# Patient Record
Sex: FEMALE | Race: WHITE
[De-identification: names, ages, dates, MRNs, and addresses within clinical notes are randomized per-mention and may not be internally consistent; named-entity substitution may affect disease eponyms.]

---

## 2018-07-17 ENCOUNTER — HOSPITAL ENCOUNTER (INPATIENT)
Dept: HOSPITAL 74 - JER | Age: 68
LOS: 2 days | Discharge: HOME | DRG: 683 | End: 2018-07-19
Payer: COMMERCIAL

## 2018-07-17 VITALS — BODY MASS INDEX: 42.4 KG/M2

## 2018-07-17 DIAGNOSIS — G43.909: ICD-10-CM

## 2018-07-17 DIAGNOSIS — I10: ICD-10-CM

## 2018-07-17 DIAGNOSIS — N17.9: Primary | ICD-10-CM

## 2018-07-17 DIAGNOSIS — E66.9: ICD-10-CM

## 2018-07-17 DIAGNOSIS — N20.0: ICD-10-CM

## 2018-07-17 DIAGNOSIS — E86.0: ICD-10-CM

## 2018-07-17 DIAGNOSIS — E78.5: ICD-10-CM

## 2018-07-17 DIAGNOSIS — R10.9: ICD-10-CM

## 2018-07-17 LAB
ANION GAP SERPL CALC-SCNC: 7 MMOL/L (ref 8–16)
APPEARANCE UR: CLEAR
APPEARANCE UR: CLEAR
BILIRUB UR STRIP.AUTO-MCNC: NEGATIVE MG/DL
BILIRUB UR STRIP.AUTO-MCNC: NEGATIVE MG/DL
BUN SERPL-MCNC: 54 MG/DL (ref 7–18)
CALCIUM SERPL-MCNC: 9.3 MG/DL (ref 8.5–10.1)
CHLORIDE SERPL-SCNC: 104 MMOL/L (ref 98–107)
CO2 SERPL-SCNC: 30 MMOL/L (ref 21–32)
COLOR UR: (no result)
COLOR UR: (no result)
CREAT SERPL-MCNC: 3.7 MG/DL (ref 0.55–1.02)
CREAT UR-MCNC: 38.3 MG/DL (ref 20–320)
DEPRECATED RDW RBC AUTO: 14.5 % (ref 11.6–15.6)
GLUCOSE SERPL-MCNC: 89 MG/DL (ref 74–106)
HCT VFR BLD CALC: 44.4 % (ref 32.4–45.2)
HGB BLD-MCNC: 14.5 GM/DL (ref 10.7–15.3)
KETONES UR QL STRIP: NEGATIVE
KETONES UR QL STRIP: NEGATIVE
LEUKOCYTE ESTERASE UR QL STRIP.AUTO: NEGATIVE
LEUKOCYTE ESTERASE UR QL STRIP.AUTO: NEGATIVE
MCH RBC QN AUTO: 26.7 PG (ref 25.7–33.7)
MCHC RBC AUTO-ENTMCNC: 32.7 G/DL (ref 32–36)
MCV RBC: 81.6 FL (ref 80–96)
NITRITE UR QL STRIP: NEGATIVE
NITRITE UR QL STRIP: NEGATIVE
PH UR: 6 [PH] (ref 5–8)
PH UR: 7 [PH] (ref 5–8)
PLATELET # BLD AUTO: 250 K/MM3 (ref 134–434)
PMV BLD: 9.3 FL (ref 7.5–11.1)
POTASSIUM SERPLBLD-SCNC: 4.6 MMOL/L (ref 3.5–5.1)
PROT UR QL STRIP: NEGATIVE
RBC # BLD AUTO: 5.44 M/MM3 (ref 3.6–5.2)
SODIUM SERPL-SCNC: 141 MMOL/L (ref 136–145)
SP GR UR: 1 (ref 1–1.03)
SP GR UR: 1.01 (ref 1–1.03)
UROBILINOGEN UR STRIP-MCNC: NEGATIVE MG/DL (ref 0.2–1)
UROBILINOGEN UR STRIP-MCNC: NEGATIVE MG/DL (ref 0.2–1)
WBC # BLD AUTO: 7.5 K/MM3 (ref 4–10)

## 2018-07-17 RX ADMIN — ACETAMINOPHEN PRN MG: 325 TABLET ORAL at 09:10

## 2018-07-17 RX ADMIN — ACETAMINOPHEN PRN MG: 325 TABLET ORAL at 15:07

## 2018-07-17 RX ADMIN — HEPARIN SODIUM SCH UNIT: 5000 INJECTION, SOLUTION INTRAVENOUS; SUBCUTANEOUS at 21:05

## 2018-07-17 RX ADMIN — PANTOPRAZOLE SODIUM SCH MG: 40 TABLET, DELAYED RELEASE ORAL at 10:10

## 2018-07-17 RX ADMIN — HEPARIN SODIUM SCH UNIT: 5000 INJECTION, SOLUTION INTRAVENOUS; SUBCUTANEOUS at 14:31

## 2018-07-17 RX ADMIN — SODIUM CHLORIDE SCH MLS/HR: 9 INJECTION, SOLUTION INTRAVENOUS at 16:49

## 2018-07-17 RX ADMIN — MONTELUKAST SODIUM SCH MG: 10 TABLET, COATED ORAL at 21:05

## 2018-07-17 RX ADMIN — MONTELUKAST SODIUM SCH MG: 10 TABLET, COATED ORAL at 10:10

## 2018-07-17 RX ADMIN — HEPARIN SODIUM SCH: 5000 INJECTION, SOLUTION INTRAVENOUS; SUBCUTANEOUS at 10:00

## 2018-07-17 RX ADMIN — ATENOLOL SCH MG: 50 TABLET ORAL at 10:45

## 2018-07-17 NOTE — PDOC
Attending Attestation





- HPI


HPI: 





07/17/18 04:57


Patient is a 67 year old female with a significant past medical history of high 

cholesterol, hypertension, who presents to the ED with complaints of diarrhea 

and associated nausea that began earlier this week.  Patient reports being 

prescribed new medication by her PCP which she states is the reason for her 

symptoms.  She reports experiencing associated nausea, throat tightness and 

chest tightness after being recently being prescribed losartan, prompting her 

to come into the ED for further evaluation due to her concern.  





Denies chest pain, Sob. Denies nausea, vomiting. Denies fevers, chills. Denies 

contact with sick individuals, out of state travelling. Denies trauma to 

affected area. Denies dysuria, hematuria. Denies constipation, diarrhea. Denies 

any other symptoms. 





Allergies: None


Social history: No smoking. No alcohol. No illicit drugs. 


Surgical history: None.


PMD: Dr. Villarreal








<King Kamara - Last Filed: 07/17/18 04:57>





- Resident


Resident Name: Edouard Case





- ED Attending Attestation


I have performed the following: I have examined & evaluated the patient, The 

case was reviewed & discussed with the resident, I agree w/resident's findings 

& plan, Exceptions are as noted





- Physicial Exam


PE: 





07/17/18 19:21


*Physical Exam





General Appearance: Yes: Appropriately Dressed.  No: Apparent Distress, 

Intoxicated


HEENT: positive: EOMI, TERRELL, Normal ENT Inspection, Normal Voice, TMs Normal, 

Pharynx Normal.  negative: Pale Conjunctivae, Photophobia, Scleral Icterus (R), 

Scleral Icterus (L)


Neck: positive: Trachea midline, Normal Thyroid, Supple.  negative: Tender, 

Rigid, Carotid bruit, Stridor, Lymphadenopathy (R), Lymphadenopathy (L), 

Thyromegaly


Respiratory/Chest: positive: Lungs Clear, Normal Breath Sounds.  negative: 

Chest Tender, Respiratory Distress, Accessory Muscle Use, Labored Respiration, 

RES, Crackles, Rales, Rhonchi, Stridor, Wheezing, Dullness


Cardiovascular: positive: Regular Rhythm, Regular Rate, S1, S2.  negative: Edema

, JVD, Murmur, Bradycardia, Tachycardia


Vascular Pulses: Dorsalis-Pedis (R): 2+, Doralis-Pedis (L): 2+


Gastrointestinal/Abdominal: positive: Normal Bowel Sounds, Flat, Soft. mild 

diffuse tenderness   negative: Organomegaly, Pulsatile Mass, Increased Bowel 

Sounds, Decreased BS, Distended, Guarding, Rebound, Hernia, Hepatomegaly, 

Spleenomegaly


Lymphatic: negative: Adenopathy, Tenderness


Musculoskeletal: positive: Normal Inspection.  negative: CVA Tenderness, 

Decreased Range of Motion


Extremity: positive: Normal Capillary Refill, Normal Inspection, Normal Range 

of Motion, Pelvis Stable.  negative: Tender, Pedal Edema, Swelling, Erythema


Integumentary: positive: Normal Color, Dry, Warm.  negative: Cyanotic, Erythema

, Jaundice, Rash


Neurologic: positive: CNs II-XII NML intact, Fully Oriented, Alert, Normal Mood/

Affect, Motor Strength 5/5.  negative: EOM Palsy, Facial Droop, Sensory Deficit





- Medical Decision Making





07/17/18 19:22


Pt admitted for further care and evaluation.  





<Sourav Wahl - Last Filed: 07/17/18 19:22>

## 2018-07-17 NOTE — PN
Teaching Attending Note


Name of Resident: Ada Rai (Nephrology)





ATTENDING PHYSICIAN STATEMENT





I saw and evaluated the patient.


I reviewed the resident's note and discussed the case with the resident.


I agree with the resident's findings and plan as documented.








Nephrology


Pt is a 67 year old female with pmhx of HTN and migraine who presents to the ER 

with abdominal pain and diarrhea. She was 


found to be in acute renal failure and I was called to evaluate her. She denies 

shortness of breath. She denies


history of CKD. He does use nsaids. She was started on valsartana nd 

triamterene with hctz. She had an allergic


reaction to them associated with GI symptoms. The shortness of breath and chest 

tightness improved but her


loose stools persisted. 


pmhx migraine htn


ros loose stool


allergies hctz triamterene


family hx non contrib





 Current Medications











Generic Name Dose Route Start Last Admin





  Trade Name Freq  PRN Reason Stop Dose Admin


 


Acetaminophen/Butalbital/Caffeine  1 tablet  07/17/18 16:33  





  Fioricet -  PO   





  Q6H PRN   





  HEADACHE   





     





     





     


 


Atenolol  50 mg  07/17/18 10:15  07/17/18 10:45





  Tenormin -  PO   50 mg





  DAILY KIKI   Administration





     





     





     





     


 


Heparin Sodium (Porcine)  5,000 unit  07/17/18 09:00  07/17/18 14:31





  Heparin -  SQ   5,000 unit





  TID KIKI   Administration





     





     





     





     


 


Sodium Chloride  1,000 mls @ 50 mls/hr  07/17/18 16:15  





  Normal Saline -  IV   





  ASDIR KIKI   





     





     





     





     


 


Montelukast Sodium  10 mg  07/17/18 10:00  07/17/18 10:10





  Singulair -  PO   10 mg





  HS KIKI   Administration





     





     





     





     


 


Ondansetron HCl  4 mg  07/17/18 09:26  07/17/18 10:05





  Zofran Injection  IVPUSH   4 mg





  Q6H PRN   Administration





  NAUSEA   





     





     





     


 


Pantoprazole Sodium  40 mg  07/17/18 10:00  07/17/18 10:10





  Protonix -  PO   40 mg





  DAILY KIKI   Administration





     





     





     





     








 Laboratory Tests











  10/26/11 06/08/12 07/17/18





  07:32 11:20 04:47


 


RBC    5.44 H


 


Creatinine  0.6  D  0.7 


 


BUN   


 


Urine Protein   


 


Urine Blood   


 


Ur Random Sodium   


 


Urine Creatinine   














  07/17/18 07/17/18 07/17/18





  04:47 06:10 09:32


 


RBC   


 


Creatinine  3.7 H  


 


BUN  54 H  


 


Urine Protein   Negative 


 


Urine Blood   Negative 


 


Ur Random Sodium    89


 


Urine Creatinine    38.3








 Last Vital Signs











Temp Pulse Resp BP Pulse Ox


 


 98.2 F   56 L  20   159/80   95 


 


 07/17/18 15:49  07/17/18 15:49  07/17/18 15:49  07/17/18 15:49  07/17/18 13:20





cxr reviewed








cardio s1s2 reg


pulm clear


GI soft, obese


ext neg edema 


neuro awake and alert





Impression


1. DARLENE


2. HTN


3. allergic reaction


4. migraine


5. nephrolithiasis





Plan


- cont with fluids


- repeat labs in am


- check renal ultrasound


- repeat ua


- hold diuretics


- will check abby and anca


- attempt to obtain outpt labs from last week


- will follow


Dr Gaston

## 2018-07-17 NOTE — PDOC
*Physical Exam





- Vital Signs


 Last Vital Signs











Temp Pulse Resp BP Pulse Ox


 


 98.4 F   52 L  16   130/80   97 


 


 07/17/18 04:06  07/17/18 06:18  07/17/18 06:18  07/17/18 06:18  07/17/18 06:18














ED Treatment Course





- LABORATORY


CBC & Chemistry Diagram: 


 07/17/18 04:47





 07/17/18 04:47





- ADDITIONAL ORDERS


Additional order review: 


 Laboratory  Results











  07/17/18 07/17/18





  06:10 04:47


 


Sodium   141


 


Potassium   4.6


 


Chloride   104


 


Carbon Dioxide   30


 


Anion Gap   7 L


 


BUN   54 H


 


Creatinine   3.7 H


 


Creat Clearance w eGFR   12.22


 


Random Glucose   89


 


Calcium   9.3


 


Troponin I   < 0.02


 


Urine Color  Straw 


 


Urine Appearance  Clear 


 


Urine pH  7.0 


 


Ur Specific Gravity  1.010 


 


Urine Protein  Negative 


 


Urine Glucose (UA)  Negative 


 


Urine Ketones  Negative 


 


Urine Blood  Negative 


 


Urine Nitrite  Negative 


 


Urine Bilirubin  Negative 


 


Urine Urobilinogen  Negative 


 


Ur Leukocyte Esterase  Negative 








 











  07/17/18





  04:47


 


RBC  5.44 H


 


MCV  81.6


 


MCHC  32.7


 


RDW  14.5


 


MPV  9.3














- Medications


Given in the ED: 


ED Medications














Discontinued Medications














Generic Name Dose Route Start Last Admin





  Trade Name Freq  PRN Reason Stop Dose Admin


 


Al Hydroxide/Mg Hydroxide  30 ml  07/17/18 05:00  07/17/18 05:03





  Mylanta Oral Suspension -  PO  07/17/18 05:01  30 ml





  ONCE ONE   Administration





     





     





     





     


 


Famotidine/Sodium Chloride  20 mg in 50 mls @ 100 mls/hr  07/17/18 05:20  07/17/ 18 05:31





  Pepcid 20 Mg Premixed Ivpb -  IVPB  07/17/18 05:49  100 mls/hr





  ONCE ONE   Administration





     





     





     





     


 


Ondansetron HCl  4 mg  07/17/18 05:20  07/17/18 05:31





  Zofran Injection  IVPUSH  07/17/18 05:21  4 mg





  ONCE ONE   Administration





     





     





     





     


 


Sodium Chloride  1,000 ml  07/17/18 04:32  07/17/18 04:51





  Normal Saline -  IV  07/17/18 04:33  1,000 ml





  ONCE ONE   Administration





     





     





     





     














Medical Decision Making





- Medical Decision Making





07/17/18 08:44


CT Read: Possible stone :


Perinephric stranding with mild dilatation of the right ureter and a possible 

nonobstructing


stone on the left distally as described above. Consider CT urogram or 

retrograde study.


Findings on the right may represent a recently passed stone, extrinsic 

compression by a


bulky uterus.


Probable cyst within the left adnexa as described above, consider ultrasound 

imaging as


clinically warranted.





*DC/Admit/Observation/Transfer


Diagnosis at time of Disposition: 


 DARLENE (acute kidney injury)








- Discharge Dispostion


Condition at time of disposition: Fair





- Referrals





- Patient Instructions





- Post Discharge Activity

## 2018-07-17 NOTE — HP
CHIEF COMPLAINT: Nausea, diarrhea





PCP: Dr. Villarreal





HISTORY OF PRESENT ILLNESS:


67 year-old female with a PMH significant for HTN and HLD who presented to the 

ED with abdominal pain, nausea, and diarrhea. On 7/12/18 the patient's PCP 

modified her medication regimen by stopping atenolol and starting Verapamil and 

Triamterene-HCTZ. She took the medication for one day and experienced throat 

swelling, chest tightness, epigastric abdominal pain, and nausea. She stopped 

taking the medications and all of the symptoms resolved except the epigastric 

pain and nausea which continues. Patient also reports one day of watery, loose, 

non-bloody stools. Patient denies fever, sweats, chills. 





ER course was notable for:


(1) /98-->130/80


(2) BUN/Cr 54/3.7


(3) UA negative





Recent Travel:





PAST MEDICAL HISTORY:


Hypertension


Hyperlipidemia





PAST SURGICAL HISTORY:


None reported





Social History:


Smoking: Never


Alcohol: no


Drugs: no





Family History:


Allergies


No Known Drug Allergies Allergy (Verified 07/17/18 04:16)


 








HOME MEDICATIONS:





Per [facility] and patient:


Verapramil SR 240mg daily  - *new prescription


Triamterene/HCTZ 37.5/25 daily - *new prescription


Etodolac 400mg BID


Atenolol/chlorthalidone 100/25  1/2 tab daily (stopped)


Singulair 10mg QD





 


REVIEW OF SYSTEMS


CONSTITUTIONAL: 


Absent:  fever, chills, diaphoresis, generalized weakness, malaise, loss of 

appetite, weight change


HEENT: 


Absent:  rhinorrhea, nasal congestion, throat pain, throat swelling, difficulty 

swallowing, mouth swelling, ear pain, eye pain, visual changes


CARDIOVASCULAR: 


Absent: chest pain, syncope, palpitations, irregular heart rate, lightheadedness

, peripheral edema


RESPIRATORY: 


Absent: cough, shortness of breath, dyspnea with exertion, orthopnea, wheezing, 

stridor, hemoptysis


GASTROINTESTINAL:


+abdominal pain, nausea, diarrhea


Absent: abdominal pain, abdominal distension, nausea, vomiting, diarrhea, 

constipation, melena, hematochezia


GENITOURINARY: 


Absent: dysuria, frequency, urgency, hesitancy, hematuria, flank pain, genital 

pain


MUSCULOSKELETAL: 


Absent: myalgia, arthralgia, joint swelling, back pain, neck pain


SKIN: 


Absent: rash, itching, pallor


HEMATOLOGIC/IMMUNOLOGIC: 


Absent: easy bleeding, easy bruising, lymphadenopathy, frequent infections


ENDOCRINE:


Absent: unexplained weight gain, unexplained weight loss, heat intolerance, 

cold intolerance


NEUROLOGIC: 


Absent: headache, focal weakness or paresthesias, dizziness, unsteady gait, 

seizure, mental status changes, bladder or bowel incontinence


PSYCHIATRIC: 


Absent: anxiety, depression, suicidal or homicidal ideation, hallucinations.








PHYSICAL EXAMINATION


 Vital Signs - 24 hr











  07/17/18 07/17/18





  04:06 06:18


 


Temperature 98.4 F 


 


Pulse Rate 65 


 


Pulse Rate [  52 L





Radial]  


 


Respiratory 18 16





Rate  


 


Blood Pressure 175/98 


 


Blood Pressure  130/80





[Right Arm]  


 


O2 Sat by Pulse 97 97





Oximetry (%)  











GENERAL: Awake, alert, and fully oriented, in no acute distress.


LUNGS: Breath sounds equal, clear to auscultation bilaterally. No wheezes, and 

no crackles. No accessory muscle use.


HEART: Regular rate and rhythm, normal S1 and S2 


ABDOMEN: Soft, nontender, not distended, normoactive bowel sounds


MUSCULOSKELETAL: Normal range of motion at all joints. No bony deformities or 

tenderness. No CVA tenderness.


UPPER EXTREMITIES: 2+ pulses, warm, well-perfused. No cyanosis. No clubbing. No 

peripheral edema.


LOWER EXTREMITIES: 2+ pulses, warm, well-perfused. No calf tenderness. Trace 

bilateral edema


NEUROLOGICAL:  Cranial nerves II-XII intact. Normal speech. Normal gait.


PSYCHIATRIC: Cooperative. Good eye contact. Appropriate mood and affect.


SKIN: Warm, dry, normal turgor, no rashes or lesions noted, normal capillary 

refill. 








 Laboratory Results - last 24 hr











  07/17/18 07/17/18 07/17/18





  04:47 04:47 06:10


 


WBC  7.5  


 


RBC  5.44 H  


 


Hgb  14.5  


 


Hct  44.4  


 


MCV  81.6  


 


MCH  26.7  


 


MCHC  32.7  


 


RDW  14.5  


 


Plt Count  250  


 


MPV  9.3  


 


Sodium   141 


 


Potassium   4.6 


 


Chloride   104 


 


Carbon Dioxide   30 


 


Anion Gap   7 L 


 


BUN   54 H 


 


Creatinine   3.7 H 


 


Creat Clearance w eGFR   12.22 


 


Random Glucose   89 


 


Calcium   9.3 


 


Urine Color    Straw


 


Urine Appearance    Clear


 


Urine pH    7.0


 


Ur Specific Gravity    1.010


 


Urine Protein    Negative


 


Urine Glucose (UA)    Negative


 


Urine Ketones    Negative


 


Urine Blood    Negative


 


Urine Nitrite    Negative


 


Urine Bilirubin    Negative


 


Urine Urobilinogen    Negative


 


Ur Leukocyte Esterase    Negative











ASSESSMENT/PLAN:


67 year-old female with a PMH significant for HTN and HLD who presented to the 

ED with 5 days of abdominal pain, nausea and 1 day of diarrhea. Admitted for 

DARLENE. 





DARLENE 


   --review of home med list (see above): HCTZ, chlorthalidone, and high dose 

NSAIDS daily


   --Cr 3.7, last available Cr 0.7 (2012)


   --urine studies pending


   --hold diuretics, NSAIDS, nephrotoxic agents


   --IV fluids


   --renal consult





Hypertension


   --patient has been off all anti-hypertensives for about 5 days; stopped 

atenolol/chlorthalidone and started Valsartan and Trimaterine/HCTZ; that's when 

symptoms started


   --start atenolol 50mg daily


   --defer to Dr. Rincon/Phil for further management of BP





Abdominal pain


   --afebrile, no leukocytosis


   --CT done, pending dictation





NPO








   








Visit type





- Emergency Visit


Emergency Visit: Yes


ED Registration Date: 07/17/18


Care time: The patient presented to the Emergency Department on the above date 

and was hospitalized for further evaluation of their emergent condition.





- New Patient


This patient is new to me today: Yes


Date on this admission: 07/17/18





- Critical Care


Critical Care patient: No





Hospitalist Screening





- Colonoscopy Questionnaire


Colonoscopy Questionnaire: 





Colonoscopy Questionnaire








-   Patient:


50 - 75 years old and never had a screening colonoscopy: Unknown


History of colon or rectal polyps, or CA: Unknown


History of IBD, Crohn's disease or UC: Unknown


History of abdominal radiation therapy as a child: Unknown





-   Relative:


1 with colon or rectal CA, or polyps at age 60 or younger: Unknown


Colon or rectal CA diagnosed at age 45 or younger: Unknown


Multiple relatives with colon or rectal CA: Unknown





-   Outcome:


Screening Result: Negative Screen

## 2018-07-17 NOTE — PDOC
History of Present Illness





- General


Chief Complaint: Pain


Stated Complaint: ABD PAIN


History Source: Patient


Exam Limitations: No Limitations





- History of Present Illness


Initial Comments: 


The pt is a 67F with a history of HTN who presents for abdominal pain, nausea, 

and new onset diarrhea (x1 day). She reports that her PCP modified her HTN 

regimen 7/12/2018 to Valsartan and Triamterene-HCTZ. She took the medication 

for one day and began to experience throat swelling, chest tightness, and 

epigastric abdominal pain. She stopped taking these and her throat/chest 

tightness resolved. However, her epigastric discomfort persisted, continues to 

be associated with N, no emesis, and one day of watery loose stools without 

blood. She endorses dry mouth. She denies fevers/chills, current HA, CP, SOB, 

chest/throat tightness.


Of note, once she stopped taking her new regimen she did not restart her 

Atenolol regimen and does not recall the dose.


07/17/18 04:35





Past History





- Past Medical History


Allergies/Adverse Reactions: 


 Allergies











Allergy/AdvReac Type Severity Reaction Status Date / Time


 


No Known Drug Allergies Allergy   Verified 07/17/18 04:16











Home Medications: 


Ambulatory Orders





Atenolol [Tenormin -] 125 mg PO DAILY 11/23/15 


Fluticasone Prop 0.05% Nasal [Flonase -] 2 spray NS PRN PRN 11/23/15 


Montelukast Na [Singulair -] 10 mg PO DAILY 11/23/15 


Oxycodone HCl/Acetaminophen [Percocet 5-325 mg Tablet] 1 tab PO Q6H PRN #60 

tablet 11/25/15 








Anemia: No


Asthma: No


Cancer: No


Cardiac Disorders: No


CVA: No


COPD: No


CHF: No


Dementia: No


Diabetes: No


GI Disorders: No


 Disorders: No


HTN: Yes


Hypercholesterolemia: Yes


Liver Disease: No


Seizures: No


Thyroid Disease: No





- Surgical History


Abdominal Surgery: No


Appendectomy: No


Cardiac Surgery: No


Cholecystectomy: No


Lung Surgery: No


Neurologic Surgery: No


Orthopedic Surgery: No





- Suicide/Smoking/Psychosocial Hx


Smoking Status: No


Smoking History: Never smoked


Have you smoked in the past 12 months: No


Number of Cigarettes Smoked Daily: 0


If you are a former smoker, when did you quit?: 2000


Information on smoking cessation initiated: No


Hx Alcohol Use: No


Drug/Substance Use Hx: No


Substance Use Type: None


Hx Substance Use Treatment: No





**Review of Systems





- Review of Systems


Able to Perform ROS?: Yes


Is the patient limited English proficient: No


Constitutional: Yes: Loss of Appetite (2/2 nausea).  No: Chills, Fever, Weakness


HEENTM: Yes: See HPI.  No: Blurred Vision, Double Vision, Nose Congestion


Respiratory: No: Cough, Shortness of Breath


Cardiac (ROS): No: Chest Pain, Lightheadedness, Syncope


ABD/GI: Yes: Diarrhea, Nausea.  No: Rectal Bleeding, Vomiting


: No: Dysuria, Discharge, Hematuria


Musculoskeletal: No: Joint Pain, Muscle Pain


Integumentary: No: Pruritus, Rash


Neurological: No: Headache, Numbness, Dizziness


Psychiatric: No: Anxiety, Depression


Endocrine: No: Intolerance to Cold, Intolerance to Heat


Hematologic/Lymphatic: No: Blood Clots, Easy Bruising





*Physical Exam





- Vital Signs


 Last Vital Signs











Temp Pulse Resp BP Pulse Ox


 


 98.4 F   65   18   175/98   97 


 


 07/17/18 04:06  07/17/18 04:06  07/17/18 04:06  07/17/18 04:06  07/17/18 04:06














- Physical Exam


General Appearance: Yes: Obese.  No: Apparent Distress


HEENT: positive: EOMI, TERRELL.  negative: Scleral Icterus (R), Scleral Icterus (L)

, Pharyngeal Erythema, Rhinorrhea


Neck: negative: Tender, Supple, Lymphadenopathy (R), Lymphadenopathy (L)


Respiratory/Chest: positive: Lungs Clear, Normal Breath Sounds.  negative: 

Chest Tender, Respiratory Distress


Cardiovascular: positive: Regular Rhythm, Regular Rate, Edema (LLE 1+ pitting 

edema to mid shin).  negative: Murmur


Vascular Pulses: Femoral (R): 2+, Femoral (L): 2+, Carotid (R): 2+, Carotid (L)

: 2+, Dorsalis-Pedis (R): 2+, Doralis-Pedis (L): 2+


Gastrointestinal/Abdominal: positive: Normal Bowel Sounds, Tender (generalized 

mild TTP), Protuberent.  negative: Guarding, Rebound


Integumentary: positive: Normal Color, Dry, Warm.  negative: Jaundice


Neurologic: positive: CNs II-XII NML intact, Fully Oriented, Alert, Normal 

Response, Motor Strength 5/5





ED Treatment Course





- LABORATORY


CBC & Chemistry Diagram: 


 07/17/18 04:47





 07/17/18 04:47





Medical Decision Making





- Medical Decision Making


The patient is a 67F with a history of HTN who presents with Acute Kidney 

Injury complicated by uremia and HTN urgency.





DDx: DARLENE, AIN, UTI, HTN urgency, ACS; less likely low grade SBO; gastritis





ED Course


The patient was found to have DARLENE (Cr 3.4 from 0.6); and Uremia (BUN 54). A CT A

&P w/o contrast will be obtained as well as an ECG and Trop I. 


The patient also experienced emesis x1, will give Zofran 4mg IV x1





Dispo:


The patient will be admitted for evaluation of DARLENE and Uremia


07/17/18 06:06








*DC/Admit/Observation/Transfer


Diagnosis at time of Disposition: 


 DARLENE (acute kidney injury)








- Discharge Dispostion


Condition at time of disposition: Fair


Decision to Admit order: Yes





- Referrals





- Patient Instructions





- Post Discharge Activity

## 2018-07-17 NOTE — CON.NEP
Consult


Consult Specialty:: Nephrology


Reason for Consultation:: DARLENE





- History of Present Illness


Chief Complaint: nausea,vomiting, and diarrhea


History of Present Illness: 





MS. Bhatti is a 66 y/o female who presents to [facility] ER with complaints of nausea/

diarrhea and abdominal pain for a few days. Patient states that her PCP started 

her on a new blood pressure medicine - Triamterene/HCTZ last tuesday and 

shortly after she started to develop adverse reactions: she said her tongue 

started to swell, her chest felt tight and she started having these GI 

symptoms. The swelling and chest pain has since subsided, however, the GI 

symptoms are still persisting. In the ER, her lbs were notable for a BUN/CR: of 

54/3.7, which is new to the patient. IN review of [employer], her Cr back in  

was 0.7 and patient states she gets blood test every 6 months and has never 

been told of this issue. She also notes that her leg seems to be a bit more 

swollen than usual. She denies any CP/SOB/ and her abdominal pain has subsided 

just feeling slightly queasy.  





- History Source


History Provided By: Patient





- Past Medical History


CNS: Yes: Migraine


Cardio/Vascular: Yes: HTN, Hyperlipdemia


Psych: Yes: Depression (does not take any meds for depression )





- Past Surgical History


Past Surgical History: Yes: Arthrosocopy (R shoulder )





- Alcohol/Substance Use


Hx Alcohol Use: No


History of Substance Use: reports: None





- Smoking History


Smoking history: Never smoked


Have you smoked in the past 12 months: No


Aproximately how many cigarettes per day: 0


If you are a former smoker, when did you quit?: 





- Social History


Usual Living Arrangement: Alone


ADL: Independent


Occupation: retired school  


Place of Birth: [facility]





Home Medications





- Allergies


Allergies/Adverse Reactions: 


 Allergies











Allergy/AdvReac Type Severity Reaction Status Date / Time


 


No Known Drug Allergies Allergy   Verified 18 04:16














- Home Medications


Home Medications: 


Ambulatory Orders





Atenolol [Tenormin -] 50 mg PO ASDIR 11/23/15 


Montelukast Na [Singulair -] 10 mg PO DAILY 11/23/15 


Atorvastatin Ca [Lipitor] 20 mg PO HS 18 


Etodolac [Etodolac ER] 400 mg PO ASDIR 18 











Family Disease History





- Family Disease History


Family Disease History: Heart Disease: Father (father is  ), Other: 

Mother (mother past from alzheimers)


Other Family History: family history of breast ca.





Review of Systems





- Review of Systems


Cardiovascular: denies: Chest Pain, Palpitations


Respiratory: denies: Cough


Gastrointestinal: reports: Abdominal Pain (more epigastric region), Nausea, 

Vomiting (had 1 episode in the ER)


Genitourinary: denies: Dysuria, Hematuria


Neurological: reports: Headache





Nephrology Consult





- Height


Height: 1.5 m





- Weight


Weight: 95.254 kg





- BMI


Body Mass Index (BMI): 42.4





- Lab Results


CBC,BMP: 


 CBC, BMP





 18 04:47 





 18 04:47 








Anion Gap: 


 Anion Gap











Anion Gap  7  (8-16)  L  18  04:47    














- Physical Examination


Vital Signs: 


 Vital Signs











Temperature  98.4 F   18 04:06


 


Pulse Rate  48 L  18 10:15


 


Respiratory Rate  16   18 06:18


 


Blood Pressure  136/78   18 10:15


 


O2 Sat by Pulse Oximetry (%)  95   18 10:15











Constitutional: Yes: No Distress


Cardiovascular: Yes: Regular Rate and Rhythm, S1, S2.  No: Murmur


Respiratory: Yes: CTA Bilaterally


Gastrointestinal: Yes: Normal Bowel Sounds, Soft.  No: Tenderness


Edema: Yes


Edema: LLE: Trace, RLE: Trace


Neurological: Yes: Alert





Problem List





- Problems


(1) Acute kidney injury


Code(s): N17.9 - ACUTE KIDNEY FAILURE, UNSPECIFIED   





(2) Hypertension


Code(s): I10 - ESSENTIAL (PRIMARY) HYPERTENSION   





(3) Abdominal pain


Code(s): R10.9 - UNSPECIFIED ABDOMINAL PAIN   





Assessment/Plan





DARLENE:


-will check BETTINA and ANCA


-repeat U/A tomorrow


-c/w fluids 


-monitor electrolytes

## 2018-07-17 NOTE — EKG
Test Reason : 

Blood Pressure : ***/*** mmHG

Vent. Rate : 052 BPM     Atrial Rate : 052 BPM

   P-R Int : 176 ms          QRS Dur : 084 ms

    QT Int : 452 ms       P-R-T Axes : 030 -15 035 degrees

   QTc Int : 420 ms

 

SINUS BRADYCARDIA

OTHERWISE NORMAL ECG

Confirmed by MD FLIP, FAIZA ([id number]) on 7/17/2018 11:20:42 AM

 

Referred By:             Confirmed By:FAIZA CASTELAN MD

## 2018-07-18 LAB
ALBUMIN SERPL-MCNC: 3.1 G/DL (ref 3.4–5)
ALP SERPL-CCNC: 55 U/L (ref 45–117)
ALT SERPL-CCNC: 26 U/L (ref 12–78)
ANION GAP SERPL CALC-SCNC: 8 MMOL/L (ref 8–16)
APTT BLD: 34.1 SECONDS (ref 25.2–36.5)
AST SERPL-CCNC: 21 U/L (ref 15–37)
BASOPHILS # BLD: 1 % (ref 0–2)
BILIRUB SERPL-MCNC: 0.7 MG/DL (ref 0.2–1)
BUN SERPL-MCNC: 39 MG/DL (ref 7–18)
CALCIUM SERPL-MCNC: 8.2 MG/DL (ref 8.5–10.1)
CHLORIDE SERPL-SCNC: 109 MMOL/L (ref 98–107)
CO2 SERPL-SCNC: 27 MMOL/L (ref 21–32)
CREAT SERPL-MCNC: 3 MG/DL (ref 0.55–1.02)
DEPRECATED RDW RBC AUTO: 14.5 % (ref 11.6–15.6)
EOSINOPHIL # BLD: 2.7 % (ref 0–4.5)
GLUCOSE SERPL-MCNC: 72 MG/DL (ref 74–106)
HCT VFR BLD CALC: 42 % (ref 32.4–45.2)
HGB BLD-MCNC: 13.9 GM/DL (ref 10.7–15.3)
INR BLD: 1.13 (ref 0.82–1.09)
LYMPHOCYTES # BLD: 24.3 % (ref 8–40)
MAGNESIUM SERPL-MCNC: 2.6 MG/DL (ref 1.8–2.4)
MCH RBC QN AUTO: 27.3 PG (ref 25.7–33.7)
MCHC RBC AUTO-ENTMCNC: 33.1 G/DL (ref 32–36)
MCV RBC: 82.5 FL (ref 80–96)
MONOCYTES # BLD AUTO: 7.7 % (ref 3.8–10.2)
NEUTROPHILS # BLD: 64.3 % (ref 42.8–82.8)
PLATELET # BLD AUTO: 226 K/MM3 (ref 134–434)
PMV BLD: 9.7 FL (ref 7.5–11.1)
POTASSIUM SERPLBLD-SCNC: 4 MMOL/L (ref 3.5–5.1)
PROT SERPL-MCNC: 6.3 G/DL (ref 6.4–8.2)
PT PNL PPP: 12.8 SEC (ref 9.7–13)
RBC # BLD AUTO: 5.09 M/MM3 (ref 3.6–5.2)
SODIUM SERPL-SCNC: 144 MMOL/L (ref 136–145)
WBC # BLD AUTO: 6.3 K/MM3 (ref 4–10)

## 2018-07-18 RX ADMIN — BUTALBITAL, ACETAMINOPHEN, AND CAFFEINE PRN TABLET: 50; 325; 40 TABLET ORAL at 02:58

## 2018-07-18 RX ADMIN — SODIUM CHLORIDE SCH MLS/HR: 9 INJECTION, SOLUTION INTRAVENOUS at 09:39

## 2018-07-18 RX ADMIN — HEPARIN SODIUM SCH UNIT: 5000 INJECTION, SOLUTION INTRAVENOUS; SUBCUTANEOUS at 13:21

## 2018-07-18 RX ADMIN — BUTALBITAL, ACETAMINOPHEN, AND CAFFEINE PRN TABLET: 50; 325; 40 TABLET ORAL at 14:56

## 2018-07-18 RX ADMIN — HEPARIN SODIUM SCH UNIT: 5000 INJECTION, SOLUTION INTRAVENOUS; SUBCUTANEOUS at 05:46

## 2018-07-18 RX ADMIN — PANTOPRAZOLE SODIUM SCH MG: 40 TABLET, DELAYED RELEASE ORAL at 09:38

## 2018-07-18 RX ADMIN — BUTALBITAL, ACETAMINOPHEN, AND CAFFEINE PRN TABLET: 50; 325; 40 TABLET ORAL at 21:07

## 2018-07-18 RX ADMIN — MONTELUKAST SODIUM SCH MG: 10 TABLET, COATED ORAL at 21:09

## 2018-07-18 RX ADMIN — HEPARIN SODIUM SCH UNIT: 5000 INJECTION, SOLUTION INTRAVENOUS; SUBCUTANEOUS at 21:09

## 2018-07-18 RX ADMIN — ATENOLOL SCH MG: 50 TABLET ORAL at 09:38

## 2018-07-18 NOTE — PN
Progress Note, Physician


Chief Complaint: 





high BUN/Cr


History of Present Illness: 





no acute events overnight. patient states that her GI symptoms are resolving- 

she is not experiencing anymore nausea or diarrhea. She had a slight headache 

overnight- seems to be in concordance whenever her blood pressures rise. She is 

not having any pain or trouble urinating. she denies N/V/chest pain/SOB. Renal 

and bladder U/S showed no signs of hydronephrosis and no postvoid residual 

urine was present. 





- Current Medication List


Current Medications: 


Active Medications





Acetaminophen/Butalbital/Caffeine (Fioricet -)  1 tablet PO Q6H PRN


   PRN Reason: HEADACHE


   Last Admin: 07/18/18 02:58 Dose:  1 tablet


Atenolol (Tenormin -)  50 mg PO DAILY [facility]


   Last Admin: 07/17/18 10:45 Dose:  50 mg


Heparin Sodium (Porcine) (Heparin -)  5,000 unit SQ TID [facility]


   Last Admin: 07/18/18 05:46 Dose:  5,000 unit


Sodium Chloride (Normal Saline -)  1,000 mls @ 50 mls/hr IV ASDIR [facility]IK[facility]


   Last Admin: 07/17/18 16:49 Dose:  50 mls/hr


Montelukast Sodium (Singulair -)  10 mg PO HS [facility]


   Last Admin: 07/17/18 21:05 Dose:  10 mg


Ondansetron HCl (Zofran Injection)  4 mg IVPUSH Q6H PRN


   PRN Reason: NAUSEA


   Last Admin: 07/17/18 10:05 Dose:  4 mg


Pantoprazole Sodium (Protonix -)  40 mg PO DAILY [facility]


   Last Admin: 07/17/18 10:10 Dose:  40 mg











- Objective


Vital Signs: 


 Vital Signs











Temperature  98 F   07/18/18 06:22


 


Pulse Rate  46 L  07/18/18 06:22


 


Respiratory Rate  20   07/18/18 06:22


 


Blood Pressure  164/71   07/18/18 06:22


 


O2 Sat by Pulse Oximetry (%)  96   07/17/18 21:00











Constitutional: Yes: No Distress


Cardiovascular: Yes: Regular Rate and Rhythm, S1, S2.  No: Murmur


Respiratory: Yes: CTA Bilaterally


Gastrointestinal: Yes: Normal Bowel Sounds, Soft.  No: Tenderness


Edema: No (no LE edema )


Neurological: Yes: Alert


Labs: 


 CBC, BMP





 07/18/18 06:50 





 07/18/18 06:50 





 INR, PTT











INR  1.13  (0.82-1.09)   07/18/18  06:50    














- ....Imaging


Ultrasound: Report Reviewed





Problem List





- Problems


(1) Acute kidney injury


Code(s): N17.9 - ACUTE KIDNEY FAILURE, UNSPECIFIED   





(2) Hypertension


Code(s): I10 - ESSENTIAL (PRIMARY) HYPERTENSION   





(3) Abdominal pain


Code(s): R10.9 - UNSPECIFIED ABDOMINAL PAIN   





Assessment/Plan





DARLENE:


patients Cr this morning came down from 3.7 to 3.0 and GI symptoms have since 

resolved


-ANCA and BETTINA still pending


-U/S showed no signs of obstruction


-cont with fluids


-monitor CMP

## 2018-07-18 NOTE — PN
Progress Note, Physician


Chief Complaint: 





no complaints


straining all urine





- Current Medication List


Current Medications: 


Active Medications





Acetaminophen/Butalbital/Caffeine (Fioricet -)  1 tablet PO Q6H PRN


   PRN Reason: HEADACHE


   Last Admin: 07/18/18 02:58 Dose:  1 tablet


Atenolol (Tenormin -)  50 mg PO DAILY [facility]


   Last Admin: 07/18/18 09:38 Dose:  50 mg


Heparin Sodium (Porcine) (Heparin -)  5,000 unit SQ TID [facility]


   Last Admin: 07/18/18 05:46 Dose:  5,000 unit


Sodium Chloride (Normal Saline -)  1,000 mls @ 50 mls/hr IV ASDIR [facility]


   Last Admin: 07/18/18 09:39 Dose:  50 mls/hr


Montelukast Sodium (Singulair -)  10 mg PO HS [facility]


   Last Admin: 07/17/18 21:05 Dose:  10 mg


Ondansetron HCl (Zofran Injection)  4 mg IVPUSH Q6H PRN


   PRN Reason: NAUSEA


   Last Admin: 07/17/18 10:05 Dose:  4 mg


Pantoprazole Sodium (Protonix -)  40 mg PO DAILY [facility]


   Last Admin: 07/18/18 09:38 Dose:  40 mg











- Objective


Vital Signs: 


 Vital Signs











Temperature  97.8 F   07/18/18 10:00


 


Pulse Rate  54 L  07/18/18 10:00


 


Respiratory Rate  18   07/18/18 10:00


 


Blood Pressure  173/70   07/18/18 10:00


 


O2 Sat by Pulse Oximetry (%)  95   07/18/18 09:00











Constitutional: Yes: No Distress, Calm


Cardiovascular: Yes: Regular Rate and Rhythm


Respiratory: Yes: CTA Bilaterally


Gastrointestinal: Yes: Normal Bowel Sounds, Soft.  No: Tenderness


Edema: No


Labs: 


 CBC, BMP





 07/18/18 06:50 





 07/18/18 06:50 





 INR, PTT











INR  1.13  (0.82-1.09)   07/18/18  06:50    














Problem List





- Problems


(1) Acute kidney injury


Code(s): N17.9 - ACUTE KIDNEY FAILURE, UNSPECIFIED   





(2) Hypertension


Code(s): I10 - ESSENTIAL (PRIMARY) HYPERTENSION   





Assessment/Plan





PLAN


non obstructive stone on CT 


Urology eval 


Her creatinine on 7/10/18-- 1.2


iv fluids


 avoid nephrotoxic drugs


add Hydralazine - for better BP control

## 2018-07-18 NOTE — PN
Teaching Attending Note


Name of Resident: Ada Rai (Nephrology)





ATTENDING PHYSICIAN STATEMENT





I saw and evaluated the patient.


I reviewed the resident's note and discussed the case with the resident.


I agree with the resident's findings and plan as documented.








Renal 


Pt seen and examined at bedside. She is awake and alert. She denies shortness 

of breath





 Current Medications











Generic Name Dose Route Start Last Admin





  Trade Name Freq  PRN Reason Stop Dose Admin


 


Acetaminophen/Butalbital/Caffeine  1 tablet  07/17/18 16:33  07/18/18 02:58





  Fioricet -  PO   1 tablet





  Q6H PRN   Administration





  HEADACHE   





     





     





     


 


Atenolol  50 mg  07/17/18 10:15  07/18/18 09:38





  Tenormin -  PO   50 mg





  DAILY KIKI   Administration





     





     





     





     


 


Heparin Sodium (Porcine)  5,000 unit  07/17/18 09:00  07/18/18 05:46





  Heparin -  SQ   5,000 unit





  TID KIKI   Administration





     





     





     





     


 


Sodium Chloride  1,000 mls @ 50 mls/hr  07/17/18 16:15  07/18/18 09:39





  Normal Saline -  IV   50 mls/hr





  ASDIR KIKI   Administration





     





     





     





     


 


Montelukast Sodium  10 mg  07/17/18 10:00  07/17/18 21:05





  Singulair -  PO   10 mg





  HS KIKI   Administration





     





     





     





     


 


Ondansetron HCl  4 mg  07/17/18 09:26  07/17/18 10:05





  Zofran Injection  IVPUSH   4 mg





  Q6H PRN   Administration





  NAUSEA   





     





     





     


 


Pantoprazole Sodium  40 mg  07/17/18 10:00  07/18/18 09:38





  Protonix -  PO   40 mg





  DAILY KIKI   Administration





     





     





     





     








 Laboratory Tests











  07/17/18 07/17/18 07/18/18





  04:47 19:15 06:50


 


Creatinine  3.7 H   3.0 H


 


Urine Protein   Negative 


 


Urine Blood   Negative 


 


BETTINA Screen   


 


c-ANCA   


 


Proteinase 3 (PR3)   


 


p-ANCA   


 


Atypical p-ANCA   


 


Myeloperoxidase Ab   














  07/18/18





  06:50


 


Creatinine 


 


Urine Protein 


 


Urine Blood 


 


BETTINA Screen  Pending


 


c-ANCA  Pending


 


Proteinase 3 (PR3)  Pending


 


p-ANCA  Pending


 


Atypical p-ANCA  Pending


 


Myeloperoxidase Ab  Pending








 Last Vital Signs











Temp Pulse Resp BP Pulse Ox


 


 97.8 F   54 L  18   173/70   95 


 


 07/18/18 10:00  07/18/18 10:00  07/18/18 10:00  07/18/18 10:00  07/18/18 09:00











cardio s1s2 reg


pulm clear


GI soft, obese


ext neg edema 


neuro awake and alert





Impression


1. DARLENE


2. HTN


3. allergic reaction


4. migraine


5. nephrolithiasis





Plan


- garcia fluids to 1/2 ns and increase rate


- repeat ua reviewed


- renal function is improving


- renal workup in progress


- pt has crt of 1.2 about a week ago


- will give hydralazine for elevated BP


- hold diuretics

## 2018-07-18 NOTE — HP
Admitting History and Physical





- Past Medical History


CNS: Yes: Migraine


Cardiovascular: Yes: HTN, Hyperlipdemia


...LMP Comment: 


...Pregnant: No


Psych: Yes: Depression (does not take any meds for depression )





- Past Surgical History


Past Surgical History: Yes: Arthrosocopy (R shoulder )





- Smoking History


Smoking history: Never smoked


Have you smoked in the past 12 months: No


Aproximately how many cigarettes per day: 0


If you are a former smoker, when did you quit?: 





- Alcohol/Substance Use


Hx Alcohol Use: No


History of Substance Use: reports: None





- Social History


ADL: Independent


Occupation: retired school  





Home Medications





- Allergies


Allergies/Adverse Reactions: 


 Allergies











Allergy/AdvReac Type Severity Reaction Status Date / Time


 


No Known Drug Allergies Allergy   Verified 18 04:16














- Home Medications


Home Medications: 


Ambulatory Orders





Atenolol [Tenormin -] 50 mg PO ASDIR 11/23/15 


Montelukast Na [Singulair -] 10 mg PO DAILY 11/23/15 


Atorvastatin Ca [Lipitor] 20 mg PO HS 18 


Etodolac [Etodolac ER] 400 mg PO ASDIR 18 











Family Disease History





- Family Disease History


Family Disease History: Heart Disease: Father (father is  ), Other: 

Mother (mother past from alzheimers)


Other Family History: family history of breast ca.





Physical Examination


Vital Signs: 


 Vital Signs











Temperature  97.8 F   18 10:00


 


Pulse Rate  54 L  18 10:00


 


Respiratory Rate  18   18 10:00


 


Blood Pressure  173/70   18 10:00


 


O2 Sat by Pulse Oximetry (%)  95   18 09:00











Labs: 


 CBC, BMP





 18 06:50 





 18 06:50

## 2018-07-18 NOTE — CON.GU
Consult


Consult Specialty:: Urology


Reason for Consultation:: ARF





- History of Present Illness


Chief Complaint: Pt w recent flank pain currently asymptomatic


History of Present Illness: 





66 yo male w ARF and pain on admission. CT no evidence of stone or hydro on MAUDE 

and CT scan


cr now improving to 3 from 3.4





- Past Medical History


CNS: Yes: Migraine


Cardio/Vascular: Yes: HTN, Hyperlipdemia


...LMP Comment: 2004


...Pregnant: No


Psych: Yes: Depression (does not take any meds for depression )





- Past Surgical History


Past Surgical History: Yes: Arthrosocopy (R shoulder )





- Alcohol/Substance Use


Hx Alcohol Use: No


History of Substance Use: reports: None





- Smoking History


Smoking history: Never smoked


Have you smoked in the past 12 months: No


Aproximately how many cigarettes per day: 0


If you are a former smoker, when did you quit?: 





- Social History


Usual Living Arrangement: Alone


ADL: Independent


Occupation: retired school  





Home Medications





- Allergies


Allergies/Adverse Reactions: 


 Allergies











Allergy/AdvReac Type Severity Reaction Status Date / Time


 


No Known Drug Allergies Allergy   Verified 18 04:16














- Home Medications


Home Medications: 


Ambulatory Orders





Atenolol [Tenormin -] 50 mg PO ASDIR 11/23/15 


Montelukast Na [Singulair -] 10 mg PO DAILY 11/23/15 


Atorvastatin Ca [Lipitor] 20 mg PO HS 18 


Etodolac [Etodolac ER] 400 mg PO ASDIR 18 











Family Disease History





- Family Disease History


Family Disease History: Heart Disease: Father (father is  ), Other: 

Mother (mother past from alzheimers)


Other Family History: family history of breast ca.





Physical Exam-


Vital Signs: 


 Vital Signs











Temperature  97.8 F   18 10:00


 


Pulse Rate  54 L  18 10:00


 


Respiratory Rate  18   18 10:00


 


Blood Pressure  173/70   18 10:00


 


O2 Sat by Pulse Oximetry (%)  95   18 09:00











Labs: 


 CBC, BMP





 18 06:50 





 18 06:50 











Imaging





- Results


Cat Scan: Report Reviewed


Ultrasound: Report Reviewed





Problem List





- Problems


(1) Acute kidney injury


Assessment/Plan: 


Clinical picture c/w passed stone 


cr improving. May require renal eval as one sided obstruction should not cause 

renal failure


Cont to follow cr


If dev pain or cr increases will require renal scan w lasix 





Code(s): N17.9 - ACUTE KIDNEY FAILURE, UNSPECIFIED

## 2018-07-19 VITALS — SYSTOLIC BLOOD PRESSURE: 153 MMHG | TEMPERATURE: 97.5 F | HEART RATE: 47 BPM | DIASTOLIC BLOOD PRESSURE: 75 MMHG

## 2018-07-19 LAB
ALBUMIN SERPL-MCNC: 3.2 G/DL (ref 3.4–5)
ALP SERPL-CCNC: 54 U/L (ref 45–117)
ALT SERPL-CCNC: 24 U/L (ref 12–78)
ANION GAP SERPL CALC-SCNC: 7 MMOL/L (ref 8–16)
AST SERPL-CCNC: 20 U/L (ref 15–37)
BASOPHILS # BLD: 1.3 % (ref 0–2)
BILIRUB SERPL-MCNC: 0.5 MG/DL (ref 0.2–1)
BUN SERPL-MCNC: 35 MG/DL (ref 7–18)
CALCIUM SERPL-MCNC: 8.3 MG/DL (ref 8.5–10.1)
CHLORIDE SERPL-SCNC: 108 MMOL/L (ref 98–107)
CO2 SERPL-SCNC: 27 MMOL/L (ref 21–32)
CREAT SERPL-MCNC: 2.6 MG/DL (ref 0.55–1.02)
DEPRECATED RDW RBC AUTO: 14.3 % (ref 11.6–15.6)
EOSINOPHIL # BLD: 3.1 % (ref 0–4.5)
GLUCOSE SERPL-MCNC: 94 MG/DL (ref 74–106)
HCT VFR BLD CALC: 41.6 % (ref 32.4–45.2)
HGB BLD-MCNC: 13.7 GM/DL (ref 10.7–15.3)
LYMPHOCYTES # BLD: 25.1 % (ref 8–40)
MAGNESIUM SERPL-MCNC: 2.3 MG/DL (ref 1.8–2.4)
MCH RBC QN AUTO: 27.3 PG (ref 25.7–33.7)
MCHC RBC AUTO-ENTMCNC: 32.9 G/DL (ref 32–36)
MCV RBC: 83 FL (ref 80–96)
MONOCYTES # BLD AUTO: 6.3 % (ref 3.8–10.2)
NEUTROPHILS # BLD: 64.2 % (ref 42.8–82.8)
PLATELET # BLD AUTO: 221 K/MM3 (ref 134–434)
PMV BLD: 9.6 FL (ref 7.5–11.1)
POTASSIUM SERPLBLD-SCNC: 3.5 MMOL/L (ref 3.5–5.1)
PROT SERPL-MCNC: 6.6 G/DL (ref 6.4–8.2)
RBC # BLD AUTO: 5.01 M/MM3 (ref 3.6–5.2)
SODIUM SERPL-SCNC: 142 MMOL/L (ref 136–145)
WBC # BLD AUTO: 6.9 K/MM3 (ref 4–10)

## 2018-07-19 RX ADMIN — HEPARIN SODIUM SCH UNIT: 5000 INJECTION, SOLUTION INTRAVENOUS; SUBCUTANEOUS at 06:31

## 2018-07-19 RX ADMIN — ATENOLOL SCH MG: 50 TABLET ORAL at 09:37

## 2018-07-19 RX ADMIN — PANTOPRAZOLE SODIUM SCH MG: 40 TABLET, DELAYED RELEASE ORAL at 09:37

## 2018-07-19 NOTE — PN
Teaching Attending Note


Name of Resident: Ada Rai (Nephrology)





ATTENDING PHYSICIAN STATEMENT





I saw and evaluated the patient.


I reviewed the resident's note and discussed the case with the resident.


I agree with the resident's findings and plan as documented.








Pt is eager to go home. She denies shortness of breath. 





 Last Vital Signs











Temp Pulse Resp BP Pulse Ox


 


 97.5 F L  47 L  17   153/75   95 


 


 07/19/18 08:08  07/19/18 08:08  07/19/18 08:08  07/19/18 08:08  07/19/18 09:00








 Laboratory Tests











  07/17/18 07/18/18





  19:15 06:50


 


Urine Protein  Negative 


 


Urine Blood  Negative 


 


BETTINA Screen   Pending


 


c-ANCA   Pending


 


Proteinase 3 (PR3)   Pending


 


p-ANCA   Pending


 


Atypical p-ANCA   Pending


 


Myeloperoxidase Ab   Pending














cardio s1s2 reg


pulm clear


GI soft, obese


ext neg edema 


neuro awake and alert





Impression


1. DARLENE


2. HTN


3. allergic reaction


4. migraine


5. nephrolithiasis





Plan


- renal function is improving


- renal workup needs to be followed up


- hold diuretics for now


- pt has crt of 1.2 about a week ago


- monitor bp

## 2018-07-19 NOTE — PN
Progress Note, Physician


Chief Complaint: 





im feeling good 


History of Present Illness: 





no acute events overnight. patient states she is feeling well. her headaches 

have improved and she is no longer experiencing any GI symptoms; she is not 

having any trouble urinating. she denies any CP/SOB/N/V 





- Current Medication List


Current Medications: 


Active Medications





Acetaminophen/Butalbital/Caffeine (Fioricet -)  1 tablet PO Q6H PRN


   PRN Reason: HEADACHE


   Last Admin: 07/18/18 21:07 Dose:  1 tablet


Atenolol (Tenormin -)  50 mg PO DAILY [facility]


   Last Admin: 07/19/18 09:37 Dose:  50 mg


Heparin Sodium (Porcine) (Heparin -)  5,000 unit SQ TID [facility]


   Last Admin: 07/19/18 06:31 Dose:  5,000 unit


Hydralazine HCl (Apresoline -)  10 mg PO BID [facility]


   Last Admin: 07/19/18 09:37 Dose:  10 mg


Sodium Chloride (1/2 Normal Saline)  1,000 mls @ 83 mls/hr IV ASDIR [facility]


   Last Admin: 07/18/18 13:20 Dose:  83 mls/hr


Montelukast Sodium (Singulair -)  10 mg PO HS [facility]


   Last Admin: 07/18/18 21:09 Dose:  10 mg


Ondansetron HCl (Zofran Injection)  4 mg IVPUSH Q6H PRN


   PRN Reason: NAUSEA


   Last Admin: 07/17/18 10:05 Dose:  4 mg


Pantoprazole Sodium (Protonix -)  40 mg PO DAILY [facility]


   Last Admin: 07/19/18 09:37 Dose:  40 mg











- Objective


Vital Signs: 


 Vital Signs











Temperature  97.5 F L  07/19/18 08:08


 


Pulse Rate  47 L  07/19/18 08:08


 


Respiratory Rate  17   07/19/18 08:08


 


Blood Pressure  153/75   07/19/18 08:08


 


O2 Sat by Pulse Oximetry (%)  95   07/18/18 09:00











Constitutional: Yes: No Distress


Cardiovascular: Yes: Regular Rate and Rhythm, S1, S2


Respiratory: Yes: CTA Bilaterally


Gastrointestinal: Yes: Normal Bowel Sounds, Soft.  No: Tenderness


Edema: No (no LE edema )


Neurological: Yes: Alert, Oriented


Labs: 


 CBC, BMP





 07/19/18 09:10 





 INR, PTT











INR  1.13  (0.82-1.09)   07/18/18  06:50    














Problem List





- Problems


(1) Acute kidney injury


Code(s): N17.9 - ACUTE KIDNEY FAILURE, UNSPECIFIED   





(2) Hypertension


Code(s): I10 - ESSENTIAL (PRIMARY) HYPERTENSION   





(3) Abdominal pain


Code(s): R10.9 - UNSPECIFIED ABDOMINAL PAIN   





Assessment/Plan





patients renal function is improving: her most recent Cr from yesterday was 3.0 

down from 3.7 on admission


obtained previous labs and Cr from 7/10/18 was 1.2 


-CMP pending; will continue to trend Cr 


-c/w IV fluids


-ANCA and BETTINA still pending

## 2018-07-19 NOTE — PN
Progress Note, Physician


Chief Complaint: 





feels well


decreased headaches


No nausea


Appetite good 








- Current Medication List


Current Medications: 


Active Medications





Acetaminophen/Butalbital/Caffeine (Fioricet -)  1 tablet PO Q6H PRN


   PRN Reason: HEADACHE


   Last Admin: 07/18/18 21:07 Dose:  1 tablet


Atenolol (Tenormin -)  50 mg PO DAILY [facility]


   Last Admin: 07/19/18 09:37 Dose:  50 mg


Heparin Sodium (Porcine) (Heparin -)  5,000 unit SQ TID [facility]


   Last Admin: 07/19/18 06:31 Dose:  5,000 unit


Hydralazine HCl (Apresoline -)  10 mg PO BID [facility]


   Last Admin: 07/19/18 09:37 Dose:  10 mg


Sodium Chloride (1/2 Normal Saline)  1,000 mls @ 83 mls/hr IV ASDIR [facility]


   Last Admin: 07/18/18 13:20 Dose:  83 mls/hr


Montelukast Sodium (Singulair -)  10 mg PO HS [facility]


   Last Admin: 07/18/18 21:09 Dose:  10 mg


Ondansetron HCl (Zofran Injection)  4 mg IVPUSH Q6H PRN


   PRN Reason: NAUSEA


   Last Admin: 07/17/18 10:05 Dose:  4 mg


Pantoprazole Sodium (Protonix -)  40 mg PO DAILY [facility]


   Last Admin: 07/19/18 09:37 Dose:  40 mg











- Objective


Vital Signs: 


 Vital Signs











Temperature  97.5 F L  07/19/18 08:08


 


Pulse Rate  47 L  07/19/18 08:08


 


Respiratory Rate  17   07/19/18 08:08


 


Blood Pressure  153/75   07/19/18 08:08


 


O2 Sat by Pulse Oximetry (%)  95   07/18/18 09:00











Constitutional: Yes: No Distress, Calm


Cardiovascular: Yes: Regular Rate and Rhythm


Respiratory: Yes: CTA Bilaterally


Gastrointestinal: Yes: Normal Bowel Sounds, Soft, Abdomen, Obese.  No: 

Tenderness


Edema: LLE: Trace, RLE: Trace


Labs: 


 CBC, BMP





 07/19/18 09:10 





 INR, PTT











INR  1.13  (0.82-1.09)   07/18/18  06:50    














Problem List





- Problems


(1) Acute kidney injury


Code(s): N17.9 - ACUTE KIDNEY FAILURE, UNSPECIFIED   





(2) Hypertension


Code(s): I10 - ESSENTIAL (PRIMARY) HYPERTENSION   





(3) Migraines


Code(s): G43.909 - MIGRAINE, UNSP, NOT INTRACTABLE, WITHOUT STATUS MIGRAINOSUS 

  





Assessment/Plan





PLAN


non obstructive stone on CT 


Urology eval appreciated


Her creatinine on 7/10/18-- 1.2


iv fluids


BMP pending 


 avoid nephrotoxic drugs


added Hydralazine -  BP control better

## 2018-07-19 NOTE — DS
Physical Examination


Vital Signs: 


 Vital Signs











Temperature  97.5 F L  07/19/18 08:08


 


Pulse Rate  47 L  07/19/18 08:08


 


Respiratory Rate  17   07/19/18 08:08


 


Blood Pressure  153/75   07/19/18 08:08


 


O2 Sat by Pulse Oximetry (%)  95   07/18/18 09:00











Constitutional: Yes: No Distress, Calm


Cardiovascular: Yes: Regular Rate and Rhythm


Respiratory: Yes: CTA Bilaterally


Gastrointestinal: Yes: Normal Bowel Sounds, Soft.  No: Tenderness


Edema: No


Labs: 


 CBC, BMP





 07/19/18 09:10 





 07/19/18 09:10 











Discharge Summary


Reason For Visit: DARLENE, UREMIA


Current Active Problems





Abdominal pain (Acute)


Acute kidney injury (Acute)


Hypertension (Acute)


Migraines (Acute)








Hospital Course: 





Admitted for acute kidney injury CT abd-- non obstructing stone


Seen by Renal and Urology


Renal failure likely due to meds, dehydration, possible passed renal stone


Creatinine better -- 2.6


pt encouraged to drink fluids, avoid nephrotoxic drugs


 will need to follow up with Dr Gaston in the office next week- stable for dc 

home


BP better controlled 


Condition: Fair





- Instructions


Referrals: 


Sohan Villarreal MD [Primary Care Provider] - 


Marta Gaston MD [Staff Physician] - 


Disposition: HOME





- Home Medications


Comprehensive Discharge Medication List: 


Ambulatory Orders





Atenolol [Tenormin -] 50 mg PO ASDIR 11/23/15 


Montelukast Na [Singulair -] 10 mg PO DAILY 11/23/15 


Atorvastatin Ca [Lipitor] 20 mg PO HS 07/17/18 


Etodolac [Etodolac ER] 400 mg PO ASDIR 07/17/18

## 2018-07-21 LAB
C-ANCA TITR SER: (no result) TITER
P-ANCA TITR SER IF: (no result) TITER
PROTEINASE3 AB SER-ACNC: <3.5 U/ML (ref 0–3.5)

## 2018-09-05 ENCOUNTER — HOSPITAL ENCOUNTER (EMERGENCY)
Dept: HOSPITAL 74 - JERFT | Age: 68
Discharge: HOME | End: 2018-09-05
Payer: COMMERCIAL

## 2018-09-05 VITALS — HEART RATE: 69 BPM | DIASTOLIC BLOOD PRESSURE: 88 MMHG | TEMPERATURE: 98.2 F | SYSTOLIC BLOOD PRESSURE: 149 MMHG

## 2018-09-05 VITALS — BODY MASS INDEX: 40.4 KG/M2

## 2018-09-05 DIAGNOSIS — H61.22: Primary | ICD-10-CM

## 2018-09-05 PROCEDURE — 3E1B78Z IRRIGATION OF EAR USING IRRIGATING SUBSTANCE, VIA NATURAL OR ARTIFICIAL OPENING: ICD-10-PCS

## 2018-09-05 NOTE — PDOC
Rapid Medical Evaluation


Time Seen by Provider: 09/05/18 19:19


Medical Evaluation: 


 Allergies











Allergy/AdvReac Type Severity Reaction Status Date / Time


 


No Known Drug Allergies Allergy   Verified 07/17/18 04:16











09/05/18 19:19


pt c/O; clogged left ear, denies injury or foreign body


pt denies headache, fever


pt on brief exam: no drainage, vss


pt ordered for: none


pt to proceed to the ED





**Discharge Disposition





- Diagnosis


 Clogged ear








- Referrals





- Patient Instructions





- Post Discharge Activity

## 2018-09-05 NOTE — PDOC
History of Present Illness





- General


Chief Complaint: Ear Problem


Stated Complaint: EAR PROBLEM


Time Seen by Provider: 09/05/18 19:19





- History of Present Illness


Initial Comments: 


67-year-old female with left ear decreased hearing times one day without other 

associated symptoms she has a past medical history significant for hypertension.


09/05/18 20:37








Past History





- Past Medical History


Allergies/Adverse Reactions: 


 Allergies











Allergy/AdvReac Type Severity Reaction Status Date / Time


 


No Known Drug Allergies Allergy   Verified 09/05/18 19:22











Home Medications: 


Ambulatory Orders





Montelukast Na [Singulair -] 10 mg PO DAILY 11/23/15 


Atorvastatin Ca [Lipitor] 20 mg PO HS 07/17/18 


Acetaminophen/Caffeine/Butalb [Fioricet -] 1 tablet PO Q8H PRN #30 tablet MDD 3 

07/19/18 


Atenolol [Tenormin -] 50 mg PO DAILY #30 tablet 07/19/18 


Ciprofloxacin HCl/Dexameth [Ciprodex Otic Suspension] 4 drop AS BID 5 Days #1 

bottle 09/05/18 


Furosemide [Lasix -] 20 mg PO DAILY 09/05/18 


Irbesartan 150 mg PO ASDIR 09/05/18 








Anemia: No


Asthma: No


Cancer: No


Cardiac Disorders: No


CVA: No


COPD: No


CHF: No


Dementia: No


Diabetes: No


GI Disorders: Yes


 Disorders: No


HTN: Yes


Hypercholesterolemia: Yes


Liver Disease: No


Seizures: No


Thyroid Disease: No





- Surgical History


Abdominal Surgery: No


Appendectomy: No


Cardiac Surgery: No


Cholecystectomy: No


Lung Surgery: No


Neurologic Surgery: No


Orthopedic Surgery: No





- Suicide/Smoking/Psychosocial Hx


Smoking Status: No


Smoking History: Never smoked


Have you smoked in the past 12 months: No


Number of Cigarettes Smoked Daily: 0


If you are a former smoker, when did you quit?: 2000


Hx Alcohol Use: No


Drug/Substance Use Hx: No


Substance Use Type: None


Hx Substance Use Treatment: No





**Review of Systems





- Review of Systems


HEENTM: Yes: See HPI


All Other Systems: Reviewed and Negative





*Physical Exam





- Vital Signs


 Last Vital Signs











Temp Pulse Resp BP Pulse Ox


 


 98.2 F   69   18   149/88   96 


 


 09/05/18 19:09  09/05/18 19:09  09/05/18 19:09  09/05/18 19:09 09/05/18 19:09














- Physical Exam


Comments: 


HEAD: NC/AT


EYES: Conjuntiva clear


Ears: Right ear and tympanic membrane are normal left ear shows impacted cerumen


NOSE: No d/c


THROAT: Moist mucous membrances, oral pharanx clear, uvula midline


NECK: Supple without adenopathy


CARDIAC: S1 S2


LUNGS: CTA Full and Equal breath sounds


ABDOMEN: Soft NT ND


MS: Full ROM in all joints without edema 


NEUROLOGIC: No gross sensory or motor deficits, NVID


SKIN: Normal color and temperature no lesions or rashes


09/05/18 20:38








Medical Decision Making





- Medical Decision Making


The left ear was irrigated with half-saline and half hydrogen peroxide a needle 

 was used to remove a large chunk of impacted cerumen there was some 

slight canal irritation. I will prescribe her an antibiotic she otherwise 

tolerated this very well and had relief of symptoms and return of hearing


09/05/18 20:38








*DC/Admit/Observation/Transfer


Diagnosis at time of Disposition: 


 Clogged ear, Excessive cerumen in left ear canal








- Discharge Dispostion


Disposition: HOME


Condition at time of disposition: Improved


Decision to Admit order: No





- Referrals


Referrals: 


Sohan iVllarreal MD [Primary Care Provider] - 





- Patient Instructions


Printed Discharge Instructions:  DI for Cerumen Impaction, Cerumen Impaction


Additional Instructions: 


Use the antibiotic drops as directed. Follow-up with your primary care 

physician 2-3 days for further evaluation and treatment options.





- Post Discharge Activity

## 2019-07-29 ENCOUNTER — HOSPITAL ENCOUNTER (EMERGENCY)
Dept: HOSPITAL 74 - JER | Age: 69
Discharge: HOME | End: 2019-07-29
Payer: COMMERCIAL

## 2019-07-29 VITALS — DIASTOLIC BLOOD PRESSURE: 68 MMHG | SYSTOLIC BLOOD PRESSURE: 182 MMHG | HEART RATE: 58 BPM

## 2019-07-29 VITALS — TEMPERATURE: 98.4 F

## 2019-07-29 VITALS — BODY MASS INDEX: 40.4 KG/M2

## 2019-07-29 DIAGNOSIS — E66.9: ICD-10-CM

## 2019-07-29 DIAGNOSIS — I10: ICD-10-CM

## 2019-07-29 DIAGNOSIS — R10.10: Primary | ICD-10-CM

## 2019-07-29 DIAGNOSIS — E78.00: ICD-10-CM

## 2019-07-29 DIAGNOSIS — J45.909: ICD-10-CM

## 2019-07-29 LAB
ALBUMIN SERPL-MCNC: 4 G/DL (ref 3.4–5)
ALP SERPL-CCNC: 74 U/L (ref 45–117)
ALT SERPL-CCNC: 34 U/L (ref 13–61)
ANION GAP SERPL CALC-SCNC: 6 MMOL/L (ref 8–16)
APPEARANCE UR: CLEAR
AST SERPL-CCNC: 24 U/L (ref 15–37)
BASOPHILS # BLD: 1.2 % (ref 0–2)
BILIRUB SERPL-MCNC: 0.3 MG/DL (ref 0.2–1)
BILIRUB UR STRIP.AUTO-MCNC: NEGATIVE MG/DL
BUN SERPL-MCNC: 18.1 MG/DL (ref 7–18)
CALCIUM SERPL-MCNC: 9.4 MG/DL (ref 8.5–10.1)
CHLORIDE SERPL-SCNC: 110 MMOL/L (ref 98–107)
CO2 SERPL-SCNC: 26 MMOL/L (ref 21–32)
COLOR UR: YELLOW
CREAT SERPL-MCNC: 0.9 MG/DL (ref 0.55–1.3)
DEPRECATED RDW RBC AUTO: 14.4 % (ref 11.6–15.6)
EOSINOPHIL # BLD: 2.1 % (ref 0–4.5)
GLUCOSE SERPL-MCNC: 93 MG/DL (ref 74–106)
HCT VFR BLD CALC: 45 % (ref 32.4–45.2)
HGB BLD-MCNC: 14.9 GM/DL (ref 10.7–15.3)
KETONES UR QL STRIP: NEGATIVE
LEUKOCYTE ESTERASE UR QL STRIP.AUTO: NEGATIVE
LYMPHOCYTES # BLD: 24.2 % (ref 8–40)
MCH RBC QN AUTO: 27.4 PG (ref 25.7–33.7)
MCHC RBC AUTO-ENTMCNC: 33 G/DL (ref 32–36)
MCV RBC: 82.9 FL (ref 80–96)
MONOCYTES # BLD AUTO: 7.9 % (ref 3.8–10.2)
NEUTROPHILS # BLD: 64.6 % (ref 42.8–82.8)
NITRITE UR QL STRIP: NEGATIVE
PH UR: 6.5 [PH] (ref 5–8)
PLATELET # BLD AUTO: 238 K/MM3 (ref 134–434)
PMV BLD: 9.2 FL (ref 7.5–11.1)
POTASSIUM SERPLBLD-SCNC: 4.2 MMOL/L (ref 3.5–5.1)
PROT SERPL-MCNC: 7.4 G/DL (ref 6.4–8.2)
PROT UR QL STRIP: NEGATIVE
PROT UR QL STRIP: NEGATIVE
RBC # BLD AUTO: 5.43 M/MM3 (ref 3.6–5.2)
SODIUM SERPL-SCNC: 142 MMOL/L (ref 136–145)
SP GR UR: 1.01 (ref 1.01–1.03)
UROBILINOGEN UR STRIP-MCNC: 0.2 MG/DL (ref 0.2–1)
WBC # BLD AUTO: 6.7 K/MM3 (ref 4–10)

## 2019-07-29 NOTE — PDOC
History of Present Illness





<Carolynn Carlton - Last Filed: 07/29/19 13:11>





- General


History Source: Patient





- History of Present Illness


Timing/Duration: reports: intermittent


Quality: reports: cramping





<Deven Mason - Last Filed: 07/29/19 13:48>





- General


Chief Complaint: Pain, Acute


Stated Complaint: LOWER BACK PAIN/ ABD PAIN


Time Seen by Provider: 07/29/19 11:46





Past History





<Carolynn Carlton - Last Filed: 07/29/19 13:11>





- Past Medical History


Anemia: No


Asthma: No


Cancer: No


Cardiac Disorders: No


CVA: No


COPD: No


CHF: No


Dementia: No


Diabetes: No


GI Disorders: Yes


 Disorders: No


HTN: Yes


Hypercholesterolemia: Yes


Liver Disease: No


Seizures: No


Thyroid Disease: No





- Surgical History


Abdominal Surgery: No


Appendectomy: No


Cardiac Surgery: No


Cholecystectomy: No


Lung Surgery: No


Neurologic Surgery: No


Orthopedic Surgery: No





- Immunization History


Immunization Up to Date: No





- Suicide/Smoking/Psychosocial Hx


Smoking Status: No


Smoking History: Never smoked


Have you smoked in the past 12 months: No


Number of Cigarettes Smoked Daily: 0


If you are a former smoker, when did you quit?: 2000


Information on smoking cessation initiated: No


Hx Alcohol Use: No


Drug/Substance Use Hx: No


Substance Use Type: None


Hx Substance Use Treatment: No





<Deven Mason - Last Filed: 07/29/19 13:48>





- Past Medical History


Allergies/Adverse Reactions: 


 Allergies











Allergy/AdvReac Type Severity Reaction Status Date / Time


 


No Known Drug Allergies Allergy   Verified 09/05/18 19:22











Home Medications: 


Ambulatory Orders





Montelukast Na [Singulair -] 10 mg PO DAILY 11/23/15 


Atorvastatin Ca [Lipitor] 20 mg PO HS 07/17/18 


Acetaminophen/Caffeine/Butalb [Fioricet -] 1 tablet PO Q8H PRN #30 tablet MDD 3 

07/19/18 


Atenolol [Tenormin -] 50 mg PO DAILY #30 tablet 07/19/18 


Ciprofloxacin HCl/Dexameth [Ciprodex Otic Suspension] 4 drop AS BID 5 Days #1 

bottle 09/05/18 


Furosemide [Lasix -] 20 mg PO DAILY 09/05/18 


Irbesartan 150 mg PO ASDIR 09/05/18 


Famotidine [Pepcid] 20 mg PO BID #28 tablet 07/29/19 


Mag Hydrox/Al Hydrox/Simeth [Mylanta Suspension -] 30 ml PO Q6H #1 bottle 07/29/ 19 


Sucralfate [Carafate -] 1 gm PO BID #14 tablet 07/29/19 











**Review of Systems





- Review of Systems


Constitutional: No: Chills, Fever


ABD/GI: Yes: Abdominal cramping.  No: Blood Streaked Bowels, Constipated, 

Diarrhea, Rectal Bleeding, Vomiting, Tarry Stools


: No: Dysuria, Flank Pain, Hematuria





<Deven Mason - Last Filed: 07/29/19 13:48>





*Physical Exam





- Vital Signs


 Last Vital Signs











Temp Pulse Resp BP Pulse Ox


 


 98.4 F   56 L  16   198/92 H  96 


 


 07/29/19 11:09  07/29/19 11:09  07/29/19 11:09  07/29/19 11:09  07/29/19 11:09














<Carolynn Carlton - Last Filed: 07/29/19 13:11>





- Vital Signs


 Last Vital Signs











Temp Pulse Resp BP Pulse Ox


 


 98.4 F   56 L  16   198/92 H  96 


 


 07/29/19 11:09  07/29/19 11:09  07/29/19 11:09  07/29/19 11:09  07/29/19 11:09














- Physical Exam


General Appearance: Yes: Appropriately Dressed.  No: Apparent Distress


HEENT: positive: Normal Voice


Neck: positive: Supple


Respiratory/Chest: positive: Lungs Clear, Normal Breath Sounds.  negative: 

Respiratory Distress


Cardiovascular: positive: Regular Rate, S1, S2


Gastrointestinal/Abdominal: positive: Normal Bowel Sounds, Soft.  negative: 

Tender, Distended, Guarding


Musculoskeletal: negative: CVA Tenderness


Integumentary: positive: Dry, Warm


Neurologic: positive: Fully Oriented, Alert, Normal Mood/Affect





<Deven Mason - Last Filed: 07/29/19 13:48>





ED Treatment Course





- LABORATORY


CBC & Chemistry Diagram: 


 07/29/19 12:00





 07/29/19 12:00





- ADDITIONAL ORDERS


Additional order review: 


 Laboratory  Results











  07/29/19 07/29/19 07/29/19





  12:44 12:00 12:00


 


Sodium    142


 


Potassium    4.2


 


Chloride    110 H


 


Carbon Dioxide    26


 


Anion Gap    6 L


 


BUN    18.1 H


 


Creatinine    0.9


 


Est GFR (CKD-EPI)AfAm    76.14


 


Est GFR (CKD-EPI)NonAf    65.70


 


Random Glucose    93


 


Calcium    9.4


 


Total Bilirubin    0.3


 


AST    24


 


ALT    34


 


Alkaline Phosphatase    74


 


Total Protein    7.4


 


Albumin    4.0


 


Lipase   194 


 


Urine Color  Yellow  


 


Urine Appearance  Clear  


 


Urine pH  6.5  


 


Ur Specific Gravity  1.007 L  


 


Urine Protein  Negative  


 


Urine Glucose (UA)  Negative  


 


Urine Ketones  Negative  


 


Urine Blood  Negative  


 


Urine Nitrite  Negative  


 


Urine Bilirubin  Negative  


 


Urine Urobilinogen  0.2  


 


Ur Leukocyte Esterase  Negative  








 











  07/29/19





  12:00


 


RBC  5.43 H


 


MCV  82.9


 


MCHC  33.0


 


RDW  14.4


 


MPV  9.2


 


Neutrophils %  64.6


 


Lymphocytes %  24.2


 


Monocytes %  7.9


 


Eosinophils %  2.1


 


Basophils %  1.2














- Medications


Given in the ED: 


ED Medications














Discontinued Medications














Generic Name Dose Route Start Last Admin





  Trade Name Freq  PRN Reason Stop Dose Admin


 


Al Hydroxide/Mg Hydroxide  30 ml  07/29/19 11:52  07/29/19 12:27





  Mylanta Oral Suspension -  PO  07/29/19 11:53  30 ml





  ONCE ONE   Administration





     





     





     





     


 


Ranitidine HCl  150 mg  07/29/19 11:51  07/29/19 12:26





  Zantac -  PO  07/29/19 11:52  150 mg





  ONCE ONE   Administration





     





     





     





     














<Carolynn Carlton - Last Filed: 07/29/19 13:11>





- LABORATORY


CBC & Chemistry Diagram: 


 07/29/19 12:00





 07/29/19 12:00





<Deven Mason - Last Filed: 07/29/19 13:48>





Medical Decision Making





- Medical Decision Making


The patient was seen and evaluated in conjunction with midlevel provider under 

my direct supervision, ancillary studies were reviewed.  I agree with the plan 

as outlined LETICIA Mason. HPI, workup/dispo as outlined. VS reviewed, wnl. labs/

workup wnl, given meds/supportive measures. anticipate discharge, pcp followup, 

return precautions





07/29/19 13:11








<Carolynn Carlton - Last Filed: 07/29/19 13:11>





- Medical Decision Making





07/29/19 11:52





67 yo morbidly obesed female, w/ h/o asthma, HTN, renal stones, here w/ diffuse 

abd pain, worse to upper abd, and only worsens after po intake x 1 week. States 

pain crampy in nature, 2-3/10 and usually resolves "after my food is digested" 

.  No excessive belching, change in bowel movements, BRBPR, dysuria, nausea, 

vomiting, fever or chills. No h/o similar pain





See exam





Possible gastritis vs biliary, less likely pancreatitis, diverticulitis, 

ischemia, UTI/pyelo or recurrent renal stones


Well figueroa and stable w/ no sig ttp on abd exam


-labs


-GI cocktail>reassess











07/29/19 13:04


Labs unremarkable. Pt reports significant improvement with meds and now 

requesting food.  Abdomen remains benign on repeat exam





07/29/19 13:25


After eating in ED patient states pain returned, but now subsiding.  Abdomen 

remains benign.  Rpt MAGDA 145/90. Case discussed with ED attending, will dc with 

meds and give GI follow-up. Reasons to return to ER discussed with patient





07/29/19 13:48








<Deven Mason - Last Filed: 07/29/19 13:48>





*DC/Admit/Observation/Transfer





<CarltonCarolynndarrell Desai - Last Filed: 07/29/19 13:11>





<Deven Mason - Last Filed: 07/29/19 13:48>


Diagnosis at time of Disposition: 


 Upper abdominal pain








- Discharge Dispostion


Disposition: HOME


Condition at time of disposition: Improved





- Prescriptions


Prescriptions: 


Famotidine [Pepcid] 20 mg PO BID #28 tablet


Mag Hydrox/Al Hydrox/Simeth [Mylanta Suspension -] 30 ml PO Q6H #1 bottle


Sucralfate [Carafate -] 1 gm PO BID #14 tablet





- Referrals


Referrals: 


Brice Marcial MD [Staff Physician] - 


Farhat Jackson DO [Staff Physician] - 


Mookie Jarvis MD [Staff Physician] - 





- Patient Instructions


Additional Instructions: 


The cause of your symptoms are unclear, but might be gastritis/GERD.  You will 

need to follow-up with GI for further evaluation.  You were given 3 names.  

Please follow-up with [email or web address]. who accepts your insurance


In the meantime, take medications as prescribed and return to ER